# Patient Record
Sex: MALE | Race: WHITE
[De-identification: names, ages, dates, MRNs, and addresses within clinical notes are randomized per-mention and may not be internally consistent; named-entity substitution may affect disease eponyms.]

---

## 2018-02-26 ENCOUNTER — HOSPITAL ENCOUNTER (EMERGENCY)
Dept: HOSPITAL 45 - C.EDB | Age: 23
Discharge: HOME | End: 2018-02-26
Payer: COMMERCIAL

## 2018-02-26 VITALS
BODY MASS INDEX: 28.87 KG/M2 | BODY MASS INDEX: 28.87 KG/M2 | WEIGHT: 194.89 LBS | WEIGHT: 194.89 LBS | HEIGHT: 69.02 IN | HEIGHT: 69.02 IN

## 2018-02-26 VITALS — DIASTOLIC BLOOD PRESSURE: 81 MMHG | SYSTOLIC BLOOD PRESSURE: 135 MMHG | HEART RATE: 52 BPM | OXYGEN SATURATION: 96 %

## 2018-02-26 VITALS — TEMPERATURE: 97.7 F

## 2018-02-26 DIAGNOSIS — Z82.49: ICD-10-CM

## 2018-02-26 DIAGNOSIS — H10.32: Primary | ICD-10-CM

## 2018-02-26 NOTE — EMERGENCY ROOM VISIT NOTE
History


Report prepared by Kimberly:  Arely Jacques


Under the Supervision of:  Dr. Yumiko Tejada D.O.


First contact with patient:  04:51


Chief Complaint:  EYE ASSESSMENT


Stated Complaint:  IRRITATED EYE





History of Present Illness


The patient is a 22 year old male who presents to the Emergency Room for an eye 

assessment secondary to falling asleep with his contacts in earlier today. The 

patient states that one day ago he woke up with his left eye swollen shut, 

noting it was irritated and difficult for him to put on his contacts but he 

managed to do it. Last night he fell asleep with his contacts in, noting that 

his eyes worsened and have been hurting since. He reports having a "lazy eye", 

noting that he has had two eye surgeries in the past to try and correct it.





   Source of History:  patient


   Onset:  today


   Position:  eye


   Quality:  other (eye assessment)


Note:


Associated symptoms: eye swollen, irritated, and in pain.





Review of Systems


See HPI for pertinent positives & negatives. A total of 10 systems reviewed and 

were otherwise negative.





Past Medical & Surgical


Medical Problems:


(1) Amblyopia








Family History





Hypertension





Social History


Smoking Status:  Never Smoker


Smokeless Tobacco Use:  No


Alcohol Use:  none


Drug Use:  none


Marital Status:  single


Housing Status:  lives with roommate


Occupation Status:  [employer] student





Current/Historical Medications


No Active Prescriptions or Reported Meds





Allergies


Coded Allergies:  


     No Known Allergies (Unverified , 2/26/18)





Physical Exam


Vital Signs











  Date Time  Temp Pulse Resp B/P (MAP) Pulse Ox O2 Delivery O2 Flow Rate FiO2


 


2/26/18 05:27  52 16 135/81 96 Room Air  


 


2/26/18 04:47 36.5 59 18 169/75 98 Room Air  











Physical Exam


HEENT: Head - normocephalic and atraumatic  Eyes- right pupil 6mm and reactive 

to light, left pupil 3 mm and sluggishly reactive to light. Intraocular eye 

pressures: right was 16 and left was 18. Pain with extra ocular eye movement.   

Nose -  moist nasal mucosa without discharge. Mouth - moist buccal mucosa.  

Oropharynx is nonerythematous and there is no tonsillar exudate or edema noted.





Medical Decision & Procedures


Medications Administered











 Medications


  (Trade)  Dose


 Ordered  Sig/Livia


 Route  Start Time


 Stop Time Status Last Admin


Dose Admin


 


 Oxycodone HCl


  (Roxicodone


 Immediate Rel Tab)  10 mg  NOW  STAT


 PO  2/26/18 05:17


 2/26/18 05:20 DC 2/26/18 05:24


10 MG











Procedure


0457: Ordered Proparacaine  drops. 





0517: Ordered Oxycodone HCL 10mg PO. 











Slit Lamp Examination





Indication: Pain to the left eye





The left eye was prepped with topical proparacaine.  Slit lamp examination was 

performed in the standard fashion.  Cornea appeared clear.  Anterior chamber 

was hazy.  Scleral injection was present. No discharge present. Fluorescein 

examination performed and revealed no corneal abrasion or ulceration.  No 

foreign bodies noted. Negative Brigitte sign.  The patient tolerated the 

procedure well without complication.





ED Course


0454: Past medical records reviewed. The patient was evaluated in room B7. A 

complete history and physical exam was performed.











0517: Ordered Oxycodone HCL 10mg PO to take at home. 





0519: Checked extra ocular eye pressures: the right was 16 and left was 18. 





0621: Discussed the patient's case with Dr. Grullon, Ophthalmology. He 

suggests that the patient follow up as an outpatient today in the office. 





0625: Upon reevaluation, the patient was resting comfortably. I discussed 

findings and results with him. He verbalized agreement of the treatment plan. 

The patient was discharged home.





Medical Decision


The patient is a 22 year old male who presents to the ED for an eye assessment. 

Differential diagnosis includes iritis, corneal abrasion, conjunctivitis, 

corneal laceration, and paraorbital cellulitis. 





This is a 22-year-old male patient presents to the emergency department with 

significant discomfort in the left eye.  The patient had difficulties with his 

contacts for the past 2 days.  I thought the patient would potentially have a 

corneal ulcer or abrasion secondary to contact lens wearing but I could not see 

any signs of corneal abrasion or ulceration.  I was concerned about the 

significant scleral injection.  The anterior chamber was slightly hazy and 

pupils were different sizes.





The patient got only minimal relief from the procaine eyedrops.  I did give the 

patient to oxycodone to take once he arrived at home since he drove himself 

here to the emergency department.  I discussed the case with Dr. Woody from 

ophthalmology and he is agreed to see the patient in follow-up.





Medication Reconcilliation


Current Medication List:  was personally reviewed by me





Blood Pressure Screening


Patient's blood pressure:  Normal blood pressure


Blood pressure disposition:  Did not require urgent referral





Consults


Time Called:  0621


Consulting Physician:  Dr. Grullon, Optometry


Returned Call:  0621


Discussed the patient's case with Devon Us. He suggests that 

the patient follow up as an outpatient.





Impression





 Primary Impression:  


 Acute conjunctivitis, left eye





Scribe Attestation


The scribe's documentation has been prepared under my direction and personally 

reviewed by me in its entirety. I confirm that the note above accurately 

reflects all work, treatment, procedures, and medical decision making performed 

by me.





Departure Information


Dispostion


Home / Self-Care





Prescriptions





No Active Prescriptions or Reported Meds





Referrals


No Doctor, Assigned (PCP)





Forms


HOME CARE DOCUMENTATION FORM,                                                 

               IMPORTANT VISIT INFORMATION, WORK / SCHOOL INSTRUCTIONS





Patient Instructions


My [facility]





Additional Instructions





Rest.





Take the pain meds when you get home.





You will need to follow up with Ophthamology today for a recheck.





If you do not hear from Dr. Grullon's office by 10 am, please call them





Problem Qualifiers








 Primary Impression:  


 Acute conjunctivitis, left eye


 Acute conjunctivitis type:  unspecified  Qualified Codes:  H10.32 - 

Unspecified acute conjunctivitis, left eye